# Patient Record
Sex: MALE | Race: WHITE | NOT HISPANIC OR LATINO | Employment: OTHER | URBAN - METROPOLITAN AREA
[De-identification: names, ages, dates, MRNs, and addresses within clinical notes are randomized per-mention and may not be internally consistent; named-entity substitution may affect disease eponyms.]

---

## 2023-12-06 ENCOUNTER — TELEPHONE (OUTPATIENT)
Dept: CARDIOLOGY CLINIC | Facility: CLINIC | Age: 68
End: 2023-12-06

## 2023-12-06 NOTE — TELEPHONE ENCOUNTER
The earliest I can see him will be middle of January in view of hospital responsibilities as well as holiday schedule.    If he needs to be seen sooner, if daughter is agreeable, he can be set up with the earliest available provider.    Please let me know.

## 2024-01-23 ENCOUNTER — OFFICE VISIT (OUTPATIENT)
Dept: CARDIOLOGY CLINIC | Facility: CLINIC | Age: 69
End: 2024-01-23
Payer: COMMERCIAL

## 2024-01-23 VITALS
DIASTOLIC BLOOD PRESSURE: 82 MMHG | HEART RATE: 88 BPM | RESPIRATION RATE: 16 BRPM | WEIGHT: 212 LBS | BODY MASS INDEX: 28.1 KG/M2 | HEIGHT: 73 IN | OXYGEN SATURATION: 98 % | SYSTOLIC BLOOD PRESSURE: 132 MMHG

## 2024-01-23 DIAGNOSIS — Z79.01 ANTICOAGULATED: ICD-10-CM

## 2024-01-23 DIAGNOSIS — G72.89 PROXIMAL MYOPATHY: ICD-10-CM

## 2024-01-23 DIAGNOSIS — I48.0 PAF (PAROXYSMAL ATRIAL FIBRILLATION) (HCC): Primary | ICD-10-CM

## 2024-01-23 DIAGNOSIS — I05.9 MITRAL VALVE DISORDER: ICD-10-CM

## 2024-01-23 DIAGNOSIS — I42.9 CARDIOMYOPATHY, UNSPECIFIED TYPE (HCC): ICD-10-CM

## 2024-01-23 PROBLEM — I48.11 LONGSTANDING PERSISTENT ATRIAL FIBRILLATION (HCC): Status: ACTIVE | Noted: 2024-01-23

## 2024-01-23 PROCEDURE — 99204 OFFICE O/P NEW MOD 45 MIN: CPT | Performed by: INTERNAL MEDICINE

## 2024-01-23 PROCEDURE — 93000 ELECTROCARDIOGRAM COMPLETE: CPT | Performed by: INTERNAL MEDICINE

## 2024-01-23 RX ORDER — CARVEDILOL 12.5 MG/1
TABLET ORAL
COMMUNITY

## 2024-01-23 RX ORDER — FOLIC ACID 1 MG/1
TABLET ORAL
COMMUNITY

## 2024-01-23 RX ORDER — PREDNISONE 10 MG/1
TABLET ORAL
COMMUNITY

## 2024-01-23 RX ORDER — LOSARTAN POTASSIUM 100 MG/1
100 TABLET ORAL DAILY
COMMUNITY

## 2024-01-23 RX ORDER — ASPIRIN 81 MG/1
TABLET ORAL
COMMUNITY

## 2024-01-23 RX ORDER — UBIDECARENONE 200 MG
CAPSULE ORAL
COMMUNITY

## 2024-01-23 NOTE — PROGRESS NOTES
Cardiology Consultation     Anastacio Funes  [id number]  1955  [address] [place] [address] [facility] [address] [address]    This patient presents for cardiology consultation and evaluation.  I had seen this patient about 8 to 9 years ago while he was living in the area.  At that time patient had cardiomyopathy and atrial fibrillation as well as mitral regurgitation.  Patient underwent surgical A-fib ablation/maze as well as mitral valve repair with mitral ring at [facility].  Patient subsequently moved to [facility].  Patient does have history of hyperlipidemia and hypertension.  Patient has been maintained on Eliquis anticoagulation.  Patient also has [employer] [facility] ICD which is followed in [place].  He has been told that he might need a generator change in the next 1-1 and half years.  Patient experienced severe weakness of proximal muscles in the lower extremities which started around Thanksgiving of 2023.  Patient was found to have significantly elevated CPK in the 20,000 range.  Patient's atorvastatin was stopped.  Patient subsequently had evaluation by rheumatology.  Patient is undergoing subsequent evaluation for possible polymyositis.  Patient had a barium swallow evaluation, echocardiogram.  Patient is scheduled for MRI of the thigh area.    1. PAF (paroxysmal atrial fibrillation) (HCC)  POCT ECG      2. Anticoagulated        3. Cardiomyopathy, unspecified type (HCC)        4. Mitral valve disorder        5. Proximal myopathy          Patient Active Problem List   Diagnosis    Longstanding persistent atrial fibrillation (HCC)     History reviewed. No pertinent past medical history.  Social History     Socioeconomic History    Marital status: Single     Spouse name: Not on file    Number of children: Not on file    Years of education: Not on file    Highest education level: Not on  "file   Occupational History    Not on file   Tobacco Use    Smoking status: Never     Passive exposure: Never    Smokeless tobacco: Never   Substance and Sexual Activity    Alcohol use: Yes     Alcohol/week: 1.0 standard drink of alcohol     Types: 1 Cans of beer per week    Drug use: Never    Sexual activity: Not Currently   Other Topics Concern    Not on file   Social History Narrative    Not on file     Social Determinants of Health     Financial Resource Strain: Not on file   Food Insecurity: Not on file   Transportation Needs: Not on file   Physical Activity: Not on file   Stress: Not on file   Social Connections: Not on file   Intimate Partner Violence: Not on file   Housing Stability: Not on file      History reviewed. No pertinent family history.  History reviewed. No pertinent surgical history.    Current Outpatient Medications:     apixaban (ELIQUIS) 5 mg, , Disp: , Rfl:     aspirin (Aspirin Adult Low Strength) 81 mg EC tablet, , Disp: , Rfl:     carvedilol (COREG) 12.5 mg tablet, , Disp: , Rfl:     Coenzyme Q10 (CoQ-10) 200 MG CAPS, , Disp: , Rfl:     folic acid (FOLVITE) 1 mg tablet, , Disp: , Rfl:     losartan (COZAAR) 100 MG tablet, Take 100 mg by mouth daily, Disp: , Rfl:     methotrexate 15 MG tablet, , Disp: , Rfl:     Multiple Vitamins-Minerals (Multivitamin Adult, Minerals,) TABS, , Disp: , Rfl:     predniSONE 10 mg tablet, , Disp: , Rfl:   No Known Allergies  Vitals:    01/23/24 1518   BP: 132/82   BP Location: Left arm   Patient Position: Sitting   Cuff Size: Standard   Pulse: 88   Resp: 16   SpO2: 98%   Weight: 96.2 kg (212 lb)   Height: 6' 1\" (1.854 m)       Labs:  No visits with results within 2 Month(s) from this visit.   Latest known visit with results is:   No results found for any previous visit.     Imaging: No results found.    Review of Systems:  Review of Systems  REVIEW OF SYSTEMS:  Constitutional:  Denies fever or chills   Eyes:  Denies change in visual acuity   HENT:  Denies nasal " congestion or sore throat   Respiratory:  Denies cough or shortness of breath   Cardiovascular:  Denies chest pain or edema   GI:  Denies abdominal pain, nausea, vomiting, bloody stools or diarrhea   :  Denies dysuria, frequency, difficulty in micturition and nocturia  Musculoskeletal: Proximal muscle weakness  Neurologic:  Denies headache, focal weakness or sensory changes   Endocrine:  Denies polyuria or polydipsia   Lymphatic:  Denies swollen glands   Psychiatric:  Denies depression or anxiety    Physical Exam:  Physical Exam  PHYSICAL EXAM:  General:  Patient is not in acute distress   Head: Normocephalic, Atraumatic.  HEENT:  Both pupils normal-size atraumatic, normocephalic, nonicteric  Neck:  JVP not raised. Trachea central. No carotid bruit  Respiratory:  normal breath sounds no crackles. no rhonchi  Cardiovascular:  Regular rate and rhythm no S3 no murmurs  GI:  Abdomen soft nontender. No organomegaly.   Lymphatic:  No cervical or inguinal lymphadenopathy  Neurologic:  Patient is awake alert, oriented . Grossly nonfocal  Extremities no edema    EKG shows sinus rhythm and is within normal limits.    Echocardiogram done at [facility] showed  Normal ejection fraction of 55 to 60%  Mitral annuloplasty ring noted with trace mitral regurgitation.    Modified barium swallow overall unremarkable.    Discussion/Summary:    Patient with multiple medical problems who seems to be doing reasonably well from cardiac standpoint. Previous studies reviewed with patient. Medications reviewed and possible side effects discussed. concepts of cardiovascular disease , signs and symptoms of heart disease. Dietary and risk factor modification reinforced. All questions answered.  Safety measures reviewed. Patient advised to report any problems prompting medical attention.    Patient maintaining sinus rhythm presently.  Risks and benefits  and alternatives of anticoagulation to prevent thromboembolic risk from  atrial fibrillation discussed at length.  Patient to report any bleeding issues.    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  Patient to avoid statins.    Patient has follow-up appointment with rheumatology.  Patient presently on prednisone as well as methotrexate.    Possible etiologies and prognosis discussed with patient and family.  Daughter who is a nurse was present during the office visit.    Follow-up in 2 months or earlier as needed.  Consider physical therapy for muscle strengthening exercises.    Patient and family agreeable with the plan of care.

## 2024-02-12 ENCOUNTER — TELEPHONE (OUTPATIENT)
Dept: CARDIOLOGY CLINIC | Facility: CLINIC | Age: 69
End: 2024-02-12

## 2024-02-12 NOTE — TELEPHONE ENCOUNTER
Patient is having Bioscopy on right tight     on  2/28/24.    Patient need a letter to be cleared .        Hold for eliquis  and asa how many day ?      Please advise

## 2024-02-12 NOTE — TELEPHONE ENCOUNTER
Daughter Zarina left voicemail stating patient needs cardiac clearance.... please call back at [phone] to go over details

## 2024-04-09 ENCOUNTER — OFFICE VISIT (OUTPATIENT)
Dept: CARDIOLOGY CLINIC | Facility: CLINIC | Age: 69
End: 2024-04-09
Payer: COMMERCIAL

## 2024-04-09 VITALS
OXYGEN SATURATION: 96 % | SYSTOLIC BLOOD PRESSURE: 110 MMHG | HEIGHT: 72 IN | BODY MASS INDEX: 29.39 KG/M2 | RESPIRATION RATE: 16 BRPM | HEART RATE: 77 BPM | DIASTOLIC BLOOD PRESSURE: 70 MMHG | WEIGHT: 217 LBS

## 2024-04-09 DIAGNOSIS — I48.0 PAF (PAROXYSMAL ATRIAL FIBRILLATION) (HCC): Primary | ICD-10-CM

## 2024-04-09 DIAGNOSIS — I05.9 MITRAL VALVE DISORDER: ICD-10-CM

## 2024-04-09 DIAGNOSIS — I42.9 CARDIOMYOPATHY, UNSPECIFIED TYPE (HCC): ICD-10-CM

## 2024-04-09 DIAGNOSIS — Z79.01 ANTICOAGULATED: ICD-10-CM

## 2024-04-09 DIAGNOSIS — M60.9 MYOSITIS, UNSPECIFIED MYOSITIS TYPE, UNSPECIFIED SITE: ICD-10-CM

## 2024-04-09 DIAGNOSIS — G72.89 PROXIMAL MYOPATHY: ICD-10-CM

## 2024-04-09 PROCEDURE — 99214 OFFICE O/P EST MOD 30 MIN: CPT | Performed by: INTERNAL MEDICINE

## 2024-04-09 RX ORDER — PREDNISONE 10 MG/1
TABLET ORAL
Start: 2024-04-09

## 2024-04-09 RX ORDER — ALENDRONATE SODIUM 70 MG/1
70 TABLET ORAL
COMMUNITY

## 2024-04-09 RX ORDER — PHENOL 1.4 %
600 AEROSOL, SPRAY (ML) MUCOUS MEMBRANE DAILY
COMMUNITY

## 2024-04-09 NOTE — PROGRESS NOTES
[facility] [place]  [address]  [place] [address]  Cardiology Follow Up    Anastacio Funes  1955  [id number]      1. PAF (paroxysmal atrial fibrillation) (HCC)        2. Anticoagulated        3. Cardiomyopathy, unspecified type (HCC)        4. Mitral valve disorder        5. Proximal myopathy            Chief Complaint   Patient presents with    Follow-up       Interval History: Patient presents for follow-up visit.  Patient has history of cardiomyopathy with recovered ejection fraction as well as history of mitral valve repair.  Patient also has history of ICD followed by his cardiologist in [place].  Patient had proximal myopathy and had biopsy which showed myositis and has been on prednisone and methotrexate followed by rheumatology.  Patient also has history of paroxysmal atrial fibrillation on Eliquis anticoagulation.  Patient denies any bleeding issues.  His overall strength has improved.  His daughter is present during the office visit.  Patient is planning to go back to [place] next month.    Patient Active Problem List   Diagnosis    Longstanding persistent atrial fibrillation (HCC)     No past medical history on file.  Social History     Socioeconomic History    Marital status: Single     Spouse name: Not on file    Number of children: Not on file    Years of education: Not on file    Highest education level: Not on file   Occupational History    Not on file   Tobacco Use    Smoking status: Never     Passive exposure: Never    Smokeless tobacco: Never   Substance and Sexual Activity    Alcohol use: Yes     Alcohol/week: 1.0 standard drink of alcohol     Types: 1 Cans of beer per week    Drug use: Never    Sexual activity: Not Currently   Other Topics Concern    Not on file   Social History Narrative    Not on file     Social Determinants of Health     Financial Resource Strain: Not on file   Food Insecurity: Not on file   Transportation Needs: Not on  file   Physical Activity: Not on file   Stress: Not on file   Social Connections: Not on file   Intimate Partner Violence: Not on file   Housing Stability: Not on file      No family history on file.  No past surgical history on file.    Current Outpatient Medications:     alendronate (FOSAMAX) 70 mg tablet, Take 70 mg by mouth every 7 days, Disp: , Rfl:     apixaban (ELIQUIS) 5 mg, , Disp: , Rfl:     aspirin (Aspirin Adult Low Strength) 81 mg EC tablet, , Disp: , Rfl:     calcium carbonate (OS-FABBY) 600 MG tablet, Take 600 mg by mouth daily, Disp: , Rfl:     carvedilol (COREG) 12.5 mg tablet, , Disp: , Rfl:     cholecalciferol 1,000 units tablet, Take 1,000 Units by mouth daily, Disp: , Rfl:     Coenzyme Q10 (CoQ-10) 200 MG CAPS, , Disp: , Rfl:     folic acid (FOLVITE) 1 mg tablet, , Disp: , Rfl:     losartan (COZAAR) 100 MG tablet, Take 100 mg by mouth daily, Disp: , Rfl:     methotrexate 15 MG tablet, , Disp: , Rfl:     Multiple Vitamins-Minerals (Multivitamin Adult, Minerals,) TABS, , Disp: , Rfl:     predniSONE 10 mg tablet, , Disp: , Rfl:   Allergies   Allergen Reactions    Atorvastatin Myalgia       Labs:  No visits with results within 2 Month(s) from this visit.   Latest known visit with results is:   No results found for any previous visit.     Imaging: No results found.    Review of Systems:  Review of Systems  REVIEW OF SYSTEMS:  Constitutional:  Denies fever or chills   Eyes:  Denies change in visual acuity   HENT:  Denies nasal congestion or sore throat   Respiratory:  Denies cough or shortness of breath   Cardiovascular:  Denies chest pain or edema   GI:  Denies abdominal pain, nausea, vomiting, bloody stools or diarrhea   :  Denies dysuria, frequency, difficulty in micturition and nocturia  Musculoskeletal: Proximal myopathy with myositis  Diagnosis  Neurologic:  Denies headache, focal weakness or sensory changes   Endocrine:  Denies polyuria or polydipsia   Lymphatic:  Denies swollen glands    Psychiatric:  Denies depression or anxiety    Physical Exam:    /70 (BP Location: Left arm, Patient Position: Sitting, Cuff Size: Standard)   Pulse 77   Resp 16   Ht 6' (1.829 m)   Wt 98.4 kg (217 lb)   SpO2 96%   BMI 29.43 kg/m²     Physical Exam  PHYSICAL EXAM:  General:  Patient is not in acute distress   Head: Normocephalic, Atraumatic.  HEENT:  Both pupils normal-size atraumatic, normocephalic, nonicteric  Neck:  JVP not raised. Trachea central. No carotid bruit  Respiratory:  normal breath sounds no crackles. no rhonchi  Cardiovascular:  Regular rate and rhythm no S3 no murmurs  GI:  Abdomen soft nontender. No organomegaly.   Lymphatic:  No cervical or inguinal lymphadenopathy  Neurologic:  Patient is awake alert, oriented . Grossly nonfocal  Extremities no edema     Discussion/Summary:    Patient with multiple medical problems who seems to be doing reasonably well from cardiac standpoint. Previous studies reviewed with patient. Medications reviewed and possible side effects discussed. concepts of cardiovascular disease , signs and symptoms of heart disease. Dietary and risk factor modification reinforced. All questions answered.  Safety measures reviewed. Patient advised to report any problems prompting medical attention.    Risks and benefits  and alternatives of anticoagulation to prevent thromboembolic risk from atrial fibrillation discussed at length.  Patient to report any bleeding issues.    Patient had proximal myopathy and biopsy showed myositis and is being treated with methotrexate and prednisone.    Patient will continue to follow-up with the device clinic for his ICD in [place].  He has been told that he will need ICD generator change in approximately 1 year.    Previous cardiovascular studies reviewed.  Echocardiogram done in December 2022 showed ejection fraction of 55 to 60% ,left atrial enlargement mitral annuloplasty ring noted with trace mitral regurgitation.  This was  done in [facility].    Patient will be seen on a as needed basis and see if going back to [place].  All questions answered.